# Patient Record
Sex: MALE | Race: WHITE
[De-identification: names, ages, dates, MRNs, and addresses within clinical notes are randomized per-mention and may not be internally consistent; named-entity substitution may affect disease eponyms.]

---
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
## 2017-05-03 NOTE — EKG REPORT
SEVERITY:- ABNORMAL ECG -

SINUS RHYTHM

LEFT ATRIAL ABNORMALITY

:

Confirmed by: Sterling Mccartney MD 03-May-2017 08:12:43
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
## 2017-05-03 NOTE — ER DOCUMENT REPORT
Doctor's Note


Notes: 





05/03/17 11:23


Patient seen and evaluated at bedside, he is slightly anxious but otherwise 

cooperative, requesting assistance with detox placement for alcohol abuse, [facility], we are awaiting callback to set patient up with 

outpatient services, he denies any suicidal or homicidal ideations and has 

otherwise cleared for discharge, I will provide him with a prescription for 

Ativan to help control the symptoms related to alcohol withdrawal





Discharge





- Discharge


Clinical Impression: 


 Aggressive behavior, Alcohol abuse





Condition: Stable


Disposition: HOME, SELF-CARE


Additional Instructions: 


Alcohol Withdrawal





     Your symptoms are caused by alcohol withdrawal. After a period of frequent 

drinking, the brain and body are changed by the alcohol. When you quit or 

reduce your drinking, the nervous system becomes unstable. Withdrawal symptoms 

can start a few hours after your last drink, but sometimes don't begin until a 

couple of days later. Symptoms can include shakiness, sweating, insomnia, nausea

, vomiting, fearfulness, hallucinations, and seizures.


     In addition to the acute effects of alcohol withdrawal, we often have to 

deal with the medical effects of alcoholism. These problems often include 

dehydration, stomach irritation, intestinal bleeding, low blood sugar, liver 

disease, and pancreas inflammation.


     Treatment for alcohol withdrawal includes mild sedatives, vitamins, and 

fluids. You need to be with someone who can help if symptoms become severe. 

Many patients can withdraw at home. Admission to the hospital or a detox 

facility may be necessary if withdrawal symptoms are severe and uncontrollable.


     Abstaining from alcohol is the only effective long-term treatment. If you 

start drinking again, you will not be able to control yourself after the first 

drink. Treatment programs are available. In addition, many alcoholics benefit 

from Alcoholics Anonymous or other support groups available through your 

counselor or [employer] . AL-ANON and ALA-TEEN are support groups for 

friends and family members of an alcoholic.


     Go to the emergency room if you develop persistent vomiting, severe 

abdominal pain, fever, shortness of breath, hallucinations, uncontrollable 

tremors, or seizures.





Depression





     Your evaluation reveals that you have mental depression. While symptoms 

may be vague, they often include disturbance of sleep, fatigue, loss of appetite

, and general loss of interest in life.  While depression may be a side effect 

of drugs, or a reaction to a major change in your life, many cases have no 

known cause.


     If depression is acute, and related to a major loss in your life, you can 

expect it to clear completely with time.  If you have been depressed a long time

, are prone to repeated bouts of depression or low mood, or have been thinking 

of suicide, get help.


     Depression can be treated with anti-depressant medication and counselling.

  Long-term depression will often take a few weeks to clear, even with 

appropriate medication.  Follow-up care is important.


     Contact your physician, the hospital emergency center, crisis line, or 

your counsellor if you are losing control or having self-destructive thoughts.





Counseling Services





     It has been recommended that you seek professional counseling to assist 

you with the stresses that you are experiencing.  Most people at some time in 

their lives experience personal problems with which they need help.  Pride and 

feeling that one can't be helped keep a lot of people from the benefits of 

counseling.


 








On your behalf we have contacted the [facility] office to 

schedule you an appointment with their mental health provider.  U will be 

contacted by either [facility] or directly by the [facility] with your appointment date and time.  Please attend this 

appointment.  Please continue to pursue detox for your chronic alcohol abuse.


Prescriptions: 


Lorazepam [Ativan 0.5 mg Tablet] 0.5 mg PO Q4 PRN #30 tab


 PRN Reason:

## 2017-05-08 NOTE — ER DOCUMENT REPORT
ED General





- General


Chief Complaint: Chest Pain


Stated Complaint: CHEST DISCOMFORT


Mode of Arrival: Medic


Information source: Patient, [facility]


Notes: 


26-year-old male chronic alcoholic presents with complaints of nausea vomiting.

  Patient denies any fevers or chills notes he feels anxious.  Patient was 

given Ativan on his last visit states he took all that with alcohol


TRAVEL OUTSIDE OF THE [place] IN LAST 30 DAYS: No





- HPI


Onset: Other


Onset/Duration: Persistent


Quality of pain: Pressure


Severity: Mild


Pain Level: 1


Associated symptoms: Other


Exacerbated by: Denies


Relieved by: Denies


Similar symptoms previously: Yes


Recently seen / treated by doctor: Yes





- Related Data


Allergies/Adverse Reactions: 


 





cefaclor [From [facility]] Allergy (Verified 05/08/17 10:50)


 











Past Medical History





- Social History


Smoking Status: Current Every Day Smoker


Cigarette use (# per day): Yes


Chew tobacco use (# tins/day): No


Smoking Education Provided: No


Frequency of alcohol use: Heavy


Drug Abuse: Marijuana


Family History: Reviewed & Not Pertinent


Patient has suicidal ideation: No


Patient has homicidal ideation: No





- Past Medical History


Cardiac Medical History: Reports: Hx Hypertension


Renal/ Medical History: Denies: Hx Peritoneal Dialysis


Psychiatric Medical History: Reports: Hx Depression





- Immunizations


Hx Diphtheria, Pertussis, Tetanus Vaccination: Yes





Review of Systems





- Review of Systems


Notes: 


PHYSICAL EXAMINATION:





GENERAL: Well-appearing, well-nourished and in no acute distress.





HEAD: Atraumatic, normocephalic.





EYES: Pupils equal round and reactive to light, extraocular movements intact, 

sclera anicteric, conjunctiva are normal.





ENT: Nares patent, oropharynx clear without exudates.  Moist mucous membranes.





NECK: Normal range of motion, supple without lymphadenopathy





LUNGS: Breath sounds clear to auscultation bilaterally and equal.  No wheezes 

rales or rhonchi.





HEART: Regular rate and rhythm without murmurs





ABDOMEN: Soft, nontender, nondistended abdomen.  No guarding, no rebound.  No 

masses appreciated.





Musculoskeletal: Normal range of motion, no pitting or edema.  No cyanosis.





NEUROLOGICAL: Cranial nerves grossly intact.  Normal speech, normal gait.  

Normal sensory, motor exams 





PSYCH: Anxious





SKIN: Warm, Dry, normal turgor, no rashes or lesions noted.





Physical Exam





- Vital signs


Vitals: 


 











Temp Pulse Resp BP Pulse Ox


 


 98.5 F   107 H  19   157/94 H  96 


 


 05/08/17 10:47  05/08/17 10:47  05/08/17 10:47  05/08/17 10:47  05/08/17 10:47














Course





- Re-evaluation


Re-evalutation: 


05/08/17 11:56








05/08/17 12:50


Lab work notes no significant abnormality patient otherwise appears stable will 

require mental health evaluation





- Vital Signs


Vital signs: 


 











Temp Pulse Resp BP Pulse Ox


 


 98.5 F   107 H  19   157/94 H  96 


 


 05/08/17 10:47  05/08/17 10:47  05/08/17 10:47  05/08/17 10:47  05/08/17 10:47














- Laboratory


Result Diagrams: 


 05/08/17 10:56





 05/08/17 10:56


Laboratory results interpreted by me: 


 











  05/08/17 05/08/17 05/08/17





  10:56 10:56 11:50


 


RBC  5.60 H  


 


Calcium   10.7 H 


 


Total Bilirubin   1.9 H 


 


Direct Bilirubin   0.8 H 


 


AST   444 H 


 


ALT   413 H 


 


Alkaline Phosphatase   134 H 


 


Total Protein   8.4 H 


 


Urine Protein    100 H


 


Urine Ketones    20 H


 


Urine Ascorbic Acid    40 H


 


Salicylates   < 1.0 L 


 


Acetaminophen   < 10 L 














- EKG Interpretation by Me


EKG shows normal: Sinus rhythm, Axis, Intervals, QRS Complexes





Discharge





- Discharge


Clinical Impression: 


 Aggressive behavior, Alcohol abuse





Condition: Stable


Disposition: PSYCH HOSP/UNIT

## 2017-05-08 NOTE — ER DOCUMENT REPORT
ED Psych Disorder / Suicide





- General


Chief Complaint: Chest Pain


Stated Complaint: CHEST DISCOMFORT


Time Seen by Provider: 05/08/17 10:38


Mode of Arrival: Medic


Information source: Patient, [facility] Records


TRAVEL OUTSIDE OF THE [place] IN LAST 30 DAYS: No





- HPI


Patient complains to provider of: Other - ETOH withdrawal


Onset: Just prior to arrival


Onset was: Sudden


Suicide Risk Factors: Bipolar, Lack of social support, Male, Other - moved to 

[place] directly from rehab around the begininning of April.


Normal mood: Yes


Associated symptoms: Normal affect, Normal mood, Anxious


Similar symptoms previously: Yes - last week


Recently seen / treated by doctor: Yes - last week


Notes: 


Patient is a 26 year old male who presents with complaints of chest pain. 

Patient was seen and evaluated last week for etoh abuse. Patient today states 

he took his 30 pills of Ativan and continued to drink. Discussed with patient 

[facility] Disucssed with patient his symptoms, 

and prompted patient to present them to the provider tomorrow in a rational and 

calm manner. Patient states he does not want to go home because there are 15 

bottles of liquor that he wont want to dump.  Patient states, "what do I have 

to say or do to stay? Say I'm going to kill myself?"  Discussed with patient 

that manipulative statements do not warrant remaining in the ED as patient 

earlier stated he was not suicidal.





Patient is A&O.  Mood is anxious with normal affect. Patient denies SI/HI, 

ideations, intent, plan, or means. Patient denies A/V h; delusions not noted. 

Thought processes were organized. Conversational speech was WNL for prosody. 

Intellectual abilities were estimated within average range. Attention and focus 

were fair. Insight, judgment, and impulse control were poor.





Unspecified Alcohol Use Disorder


R/O Bipolar Disorder





Patient is psychiatrically cleared for discharge.  Patient's overall 

presentation is suggestive of attempting to meet his basic needs and stay in 

the Department until his appointment.  [facility]  I consulted with Dr. Connelly in regards to the care and 

management of this patient.





- Related Data


Allergies/Adverse Reactions: 


 





cefaclor [From Ceclor] Allergy (Verified 05/08/17 10:50)


 











Past Medical History





- General


Information source: Patient, [facility] Records





- Social History


Smoking Status: Current Every Day Smoker


Cigarette use (# per day): Yes


Chew tobacco use (# tins/day): No


Smoking Education Provided: Yes


Frequency of alcohol use: Heavy


Drug Abuse: Marijuana


Family History: Reviewed & Not Pertinent


Patient has suicidal ideation: No


Patient has homicidal ideation: No





- Past Medical History


Cardiac Medical History: Reports: Hx Hypertension


Renal/ Medical History: Denies: Hx Peritoneal Dialysis


Psychiatric Medical History: Reports: Hx Depression





- Immunizations


Hx Diphtheria, Pertussis, Tetanus Vaccination: Yes





Physical Exam





- Vital signs


Vitals: 





 











Temp Pulse Resp BP Pulse Ox


 


 98.5 F   107 H  19   157/94 H  96 


 


 05/08/17 10:47  05/08/17 10:47  05/08/17 10:47  05/08/17 10:47  05/08/17 10:47














Course





- Vital Signs


Vital signs: 





 











Temp Pulse Resp BP Pulse Ox


 


 98.7 F   86   16   138/92 H  98 


 


 05/08/17 15:11  05/08/17 15:11  05/08/17 15:11  05/08/17 15:11  05/08/17 15:11














- Laboratory


Result Diagrams: 


 05/08/17 10:56





 05/08/17 10:56


Laboratory results interpreted by me: 





 











  05/08/17 05/08/17 05/08/17





  10:56 10:56 11:50


 


RBC  5.60 H  


 


Calcium   10.7 H 


 


Total Bilirubin   1.9 H 


 


Direct Bilirubin   0.8 H 


 


AST   444 H 


 


ALT   413 H 


 


Alkaline Phosphatase   134 H 


 


Total Protein   8.4 H 


 


Urine Protein    100 H


 


Urine Ketones    20 H


 


Urine Ascorbic Acid    40 H


 


Salicylates   < 1.0 L 


 


Acetaminophen   < 10 L 














Discharge





- Discharge


Clinical Impression: 


 Aggressive behavior, Alcohol abuse





Condition: Stable


Disposition: HOME, SELF-CARE


Additional Instructions: 


Alcohol Withdrawal





     Your symptoms are caused by alcohol withdrawal. After a period of frequent 

drinking, the brain and body are changed by the alcohol. When you quit or 

reduce your drinking, the nervous system becomes unstable. Withdrawal symptoms 

can start a few hours after your last drink, but sometimes don't begin until a 

couple of days later. Symptoms can include shakiness, sweating, insomnia, nausea

, vomiting, fearfulness, hallucinations, and seizures.


     In addition to the acute effects of alcohol withdrawal, we often have to 

deal with the medical effects of alcoholism. These problems often include 

dehydration, stomach irritation, intestinal bleeding, low blood sugar, liver 

disease, and pancreas inflammation.


     Treatment for alcohol withdrawal includes mild sedatives, vitamins, and 

fluids. You need to be with someone who can help if symptoms become severe. 

Many patients can withdraw at home. Admission to the hospital or a detox 

facility may be necessary if withdrawal symptoms are severe and uncontrollable.


     Abstaining from alcohol is the only effective long-term treatment. If you 

start drinking again, you will not be able to control yourself after the first 

drink. Treatment programs are available. In addition, many alcoholics benefit 

from Alcoholics Anonymous or other support groups available through your 

counselor or [employer] . [employer] and [employer] are support groups for 

friends and family members of an alcoholic.


     Go to the emergency room if you develop persistent vomiting, severe 

abdominal pain, fever, shortness of breath, hallucinations, uncontrollable 

tremors, or seizures.








[facility] [address] at [facility].

  





Referrals: 


[facility] [Provider Group] - Follow up as needed

## 2017-05-12 NOTE — ER DOCUMENT REPORT
ED General





- General


Chief Complaint: Suicidal Ideation


Stated Complaint: SUICIDAL IDEATION


Time Seen by Provider: 05/12/17 20:29


Notes: 


Patient is a 26-year-old male with past medical history of chronic alcoholism, 

depression who presents with increasing suicidal ideation over the last 3-4 

days.  Patient states that he contacted the [employer] crisis line and they 

contacted the police.  The police removed his guns from the home and here to 

the emergency department.  Patient was seen in the emergency department several 

days ago for a similar presentation and discharge at that time.  Patient states 

these continued to drink heavily drinks approximately 2 L of whiskey daily.  

Patient notes that his depression and suicidality has been worsening as he 

continues to feel he is unable to control his drinking.  He has not noted 

anything seems to improve his symptoms.  He denies any additional acute medical 

complaints.


TRAVEL OUTSIDE OF THE [place] IN LAST 30 DAYS: No





- Related Data


Allergies/Adverse Reactions: 


 





cefaclor [From Kaznachey] Allergy (Verified 05/08/17 10:50)


 








Home Medications: 


 Current Home Medications





Gabapentin [Neurontin 300 mg Capsule] 300 mg PO Q8 05/12/17 [History]











Past Medical History





- General


Information source: Patient





- Social History


Smoking Status: Current Every Day Smoker


Frequency of alcohol use: Heavy


Drug Abuse: Marijuana


Lives with: Alone


Family History: Reviewed & Not Pertinent





- Past Medical History


Cardiac Medical History: Reports: Hx Hypertension


Renal/ Medical History: Denies: Hx Peritoneal Dialysis


Psychiatric Medical History: Reports: Hx Depression





- Immunizations


Hx Diphtheria, Pertussis, Tetanus Vaccination: Yes





Review of Systems





- Review of Systems


Notes: 


Constitutional: Negative for fever.


HENT: Negative for sore throat.


Eyes: Negative for visual changes.


Cardiovascular: Negative for chest pain.


Respiratory: Negative for shortness of breath.


Gastrointestinal: Negative for abdominal pain, vomiting or diarrhea.


Genitourinary: Negative for dysuria.


Musculoskeletal: Negative for back pain.


Skin: Negative for rash.


Neurological: Negative for headaches, weakness or numbness.





10 point ROS negative except as marked above and in HPI.





Physical Exam





- Vital signs


Interpretation: Tachycardic


Notes: 


PHYSICAL EXAMINATION:





GENERAL: Well-appearing, well-nourished and in no acute distress.





HEAD: Atraumatic, normocephalic.





EYES: Pupils equal round and reactive to light, extraocular movements intact, 

sclera anicteric, conjunctiva are normal.





ENT: nares patent, oropharynx clear without exudates.  Moist mucous membranes.





NECK: Normal range of motion, supple without lymphadenopathy





LUNGS: Breath sounds clear to auscultation bilaterally and equal.  No wheezes 

rales or rhonchi.





HEART: Regular tachycardia without murmurs





ABDOMEN: Soft, nontender, normoactive bowel sounds.  No guarding, no rebound.  

No masses appreciated.





EXTREMITIES: Normal range of motion, no pitting or edema.  No cyanosis.





NEUROLOGICAL: No focal neurological deficits. Moves all extremities 

spontaneously and on command.





PSYCH: Poor eye contact, somewhat depressed mood.  Appears to have poor insight 

into his condition.  Mildly intoxicated.





SKIN: Warm, Dry, normal turgor, no rashes or lesions noted.





Course





- Re-evaluation


Re-evalutation: 


05/12/17 20:45


Patient presents with suicidal ideation and alcohol intoxication. He has no 

specific plan but states "I'm getting to the end of my rope here and really 

think I might hurt myself".  He admits to heavy alcohol use today but notes 

that he is never truly sober. He denies any acute medical complaints. He 

clinically is sober.  He is calm and cooperative.  He does have a history of 

complex alcohol withdrawals in the past and Valium will be ordered 

prophylactically.  Patient has been placed on IVC and will be evaluated by 

psychiatry in the morning.





05/13/17 03:16


Medical screening laboratories reveal LFT elevations consistent with an chronic 

alcoholic hepatitis.  Patient has no symptoms to suggest an acute hepatitis 

including no focal right upper quadrant tenderness, fever or vital sign 

abnormalities.  His laboratory is other wise demonstrated a profound alcohol 

elevation and given patient's lucidity at time of arrival, he clearly has a 

very high alcohol tolerance.  Patient has remained with alcohol withdrawal 

symptoms at this time.  He has not required any when necessary Valiums.  He is 

cleared for psychiatric evaluation the morning.





- Laboratory


Result Diagrams: 


 05/12/17 20:25





 05/12/17 20:25


Laboratory results interpreted by me: 


 











  05/12/17 05/12/17





  20:25 20:25


 


RBC  5.61 H 


 


RDW  14.6 H 


 


Sodium   145.6 H


 


Direct Bilirubin   0.6 H


 


AST   415 H


 


ALT   419 H


 


Total Protein   8.5 H


 


Salicylates   < 1.0 L


 


Acetaminophen   < 10 L


 


Serum Alcohol   328 H*














- EKG Interpretation by Me


Additional EKG results interpreted by me: 





05/13/17 03:18


Sinus tachycardia.  Rate 102.  No ST elevations or depressions QTc is 459.

## 2017-05-13 NOTE — ER DOCUMENT REPORT
Doctor's Note


Notes: 


05/13/17 09:15


As the rounding physician for our psychiatric patients, I have reviewed the 

chart, vitals, lab work. Patient has been examined and noted to be stable. I am 

awaiting mental health in put.





05/13/17 10:14

## 2017-05-13 NOTE — PSYCHOLOGICAL NOTE
Psych Note





- Psych Note


Psych Note: 


Patient is a 26-year-old male with past medical history of chronic alcoholism, 

depression who presents with increasing suicidal ideation over the last 3-4 

days.  Patient states that he contacted the [employer] crisis line and they 

contacted the police.  The police removed his guns from the home and here to 

the emergency department.  Patient was seen in the emergency department several 

days ago for a similar presentation and discharge at that time.  Patient states 

these continued to drink heavily drinks approximately 2 L of whiskey daily.  

Patient notes that his depression and suicidality has been worsening as he 

continues to feel he is unable to control his drinking.  He has not noted 

anything seems to improve his symptoms.  He denies any additional acute medical 

complaints.





 


Clinician conducted a record review- Patient was last seen by the Behavioral 

Team on 5/8/17.  During that evaluation, it is noted the "Patient states, "what 

do I have to say or do to stay? Say I'm going to kill myself?"  Discussed with 

patient that manipulative statements do not warrant remaining in the ED as 

patient earlier stated he was not suicidal." [facility]





Patient disclosed that his suicidal ideation started "a few weeks ago."  He 

continued disclosed that he is "at the end of the rope."  He states that he is 

"spiraling" out of control.  When asked if he attended his appointment and he 

stated yes however he did not fill his prescription.  He continued disclosed 

that he is unable to fill the prescription because he gets anxious in crowds.  

Clinician asked how his suicidal ideation started a few weeks ago when he 

stated he was not suicidal 5 days ago.  Patient had no response.  Clinician 

discussed patient's stated emotions of being anxious in crowds and how that 

would affect him being inpatient.  Patient states he just wants to get help to 

keep sober.





Patient is alert and orientated to person, place, time and circumstance.  Mood 

is euthymic with congruent affect.  Patient denies suicidal and homicidal 

ideation; it is noted patient attempted to state SI that was ongoing for weeks 

which is a direct conflict of previous statements.  Patient denies auditory and 

visual hallucinations; patient is not demonstrating behaviour what would be 

congruent to responding to internal stimuli.  No delusions are noted.  Thought 

process is organized and linear.  Eye contact was well maintained.  

Intellectual abilities appear to be within average range.  Attention and 

concentration is good.  Insight, judgment, and impulse control is poor.





Unspecified Alcohol Use Disorder


R/O  Bipolar Disorder





Patient is cleared for discharge.  Patient's overall presentation is suggestive 

of attempting of achieving secondary gain.  Patient is reported to arrive at 

[facility] ED change over into scrubs and into bed without directions.  Patient 

attempted to state he was suicidal and had been for a few weeks however patient 

denied this 5 days ago.  Patient is requesting resources and assistance for 

sobriety for alcohol; clinician provided resources for both residential short 

and long-term treatment centers for substance abuse.  I consulted with Dr. Connelly in regards to the care and management of this patient.

## 2017-05-13 NOTE — EKG REPORT
SEVERITY:- OTHERWISE NORMAL ECG -

SINUS TACHYCARDIA

:

Confirmed by: Flower Pascual 13-May-2017 13:06:00

## 2018-02-17 ENCOUNTER — HOSPITAL ENCOUNTER (EMERGENCY)
Dept: HOSPITAL 62 - ER | Age: 28
LOS: 1 days | Discharge: HOME | End: 2018-02-18
Payer: OTHER GOVERNMENT

## 2018-02-17 DIAGNOSIS — S51.812A: Primary | ICD-10-CM

## 2018-02-17 DIAGNOSIS — F17.200: ICD-10-CM

## 2018-02-17 DIAGNOSIS — Z23: ICD-10-CM
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
## 2018-02-17 DIAGNOSIS — F10.920: ICD-10-CM

## 2018-02-17 PROCEDURE — 36415 COLL VENOUS BLD VENIPUNCTURE: CPT

## 2018-02-17 PROCEDURE — 0HQEXZZ REPAIR LEFT LOWER ARM SKIN, EXTERNAL APPROACH: ICD-10-PCS | Performed by: NURSE PRACTITIONER

## 2018-02-17 PROCEDURE — 80307 DRUG TEST PRSMV CHEM ANLYZR: CPT

## 2018-02-17 PROCEDURE — 85025 COMPLETE CBC W/AUTO DIFF WBC: CPT

## 2018-02-17 PROCEDURE — 90471 IMMUNIZATION ADMIN: CPT

## 2018-02-17 PROCEDURE — 90715 TDAP VACCINE 7 YRS/> IM: CPT

## 2018-02-17 PROCEDURE — 99284 EMERGENCY DEPT VISIT MOD MDM: CPT

## 2018-02-17 PROCEDURE — 80053 COMPREHEN METABOLIC PANEL: CPT

## 2018-02-17 PROCEDURE — 81001 URINALYSIS AUTO W/SCOPE: CPT

## 2018-02-18 VITALS — SYSTOLIC BLOOD PRESSURE: 124 MMHG | DIASTOLIC BLOOD PRESSURE: 78 MMHG

## 2018-02-18 LAB
ADD MANUAL DIFF: NO
ALBUMIN SERPL-MCNC: 4.7 G/DL (ref 3.5–5)
ALP SERPL-CCNC: 59 U/L (ref 38–126)
ALT SERPL-CCNC: 45 U/L (ref 21–72)
ANION GAP SERPL CALC-SCNC: 12 MMOL/L (ref 5–19)
APPEARANCE UR: CLEAR
APTT PPP: YELLOW S
AST SERPL-CCNC: 60 U/L (ref 17–59)
BARBITURATES UR QL SCN: NEGATIVE
BASOPHILS # BLD AUTO: 0.1 10^3/UL (ref 0–0.2)
BASOPHILS NFR BLD AUTO: 1.1 % (ref 0–2)
BILIRUB DIRECT SERPL-MCNC: 0.2 MG/DL (ref 0–0.4)
BILIRUB SERPL-MCNC: 0.4 MG/DL (ref 0.2–1.3)
BILIRUB UR QL STRIP: NEGATIVE
BUN SERPL-MCNC: 14 MG/DL (ref 7–20)
CALCIUM: 9.8 MG/DL (ref 8.4–10.2)
CHLORIDE SERPL-SCNC: 107 MMOL/L (ref 98–107)
CO2 SERPL-SCNC: 27 MMOL/L (ref 22–30)
EOSINOPHIL # BLD AUTO: 0 10^3/UL (ref 0–0.6)
EOSINOPHIL NFR BLD AUTO: 0.1 % (ref 0–6)
ERYTHROCYTE [DISTWIDTH] IN BLOOD BY AUTOMATED COUNT: 13.9 % (ref 11.5–14)
ETHANOL SERPL-MCNC: 261 MG/DL
GLUCOSE SERPL-MCNC: 98 MG/DL (ref 75–110)
GLUCOSE UR STRIP-MCNC: NEGATIVE MG/DL
HCT VFR BLD CALC: 46.6 % (ref 37.9–51)
HGB BLD-MCNC: 16.4 G/DL (ref 13.5–17)
KETONES UR STRIP-MCNC: NEGATIVE MG/DL
LYMPHOCYTES # BLD AUTO: 2.4 10^3/UL (ref 0.5–4.7)
LYMPHOCYTES NFR BLD AUTO: 40 % (ref 13–45)
MCH RBC QN AUTO: 33.7 PG (ref 27–33.4)
MCHC RBC AUTO-ENTMCNC: 35.3 G/DL (ref 32–36)
MCV RBC AUTO: 96 FL (ref 80–97)
METHADONE UR QL SCN: NEGATIVE
MONOCYTES # BLD AUTO: 0.5 10^3/UL (ref 0.1–1.4)
MONOCYTES NFR BLD AUTO: 8.9 % (ref 3–13)
NEUTROPHILS # BLD AUTO: 2.9 10^3/UL (ref 1.7–8.2)
NEUTS SEG NFR BLD AUTO: 49.9 % (ref 42–78)
NITRITE UR QL STRIP: NEGATIVE
PCP UR QL SCN: NEGATIVE
PH UR STRIP: 7 [PH] (ref 5–9)
PLATELET # BLD: 280 10^3/UL (ref 150–450)
POTASSIUM SERPL-SCNC: 4.5 MMOL/L (ref 3.6–5)
PROT SERPL-MCNC: 7.4 G/DL (ref 6.3–8.2)
PROT UR STRIP-MCNC: NEGATIVE MG/DL
RBC # BLD AUTO: 4.88 10^6/UL (ref 4.35–5.55)
SODIUM SERPL-SCNC: 146.3 MMOL/L (ref 137–145)
SP GR UR STRIP: 1.02
TOTAL CELLS COUNTED % (AUTO): 100 %
URINE AMPHETAMINES SCREEN: NEGATIVE
URINE BENZODIAZEPINES SCREEN: (no result)
URINE COCAINE SCREEN: NEGATIVE
URINE MARIJUANA (THC) SCREEN: (no result)
UROBILINOGEN UR-MCNC: 4 MG/DL (ref ?–2)
WBC # BLD AUTO: 5.9 10^3/UL (ref 4–10.5)

## 2018-02-18 NOTE — RADIOLOGY REPORT (SQ)
EXAM DESCRIPTION: 



FOREARM LEFT



CLINICAL HISTORY: 



27 years, Male, laceration



COMPARISON:

None.



NUMBER OF VIEWS:

2





Findings:



Bones, joints, and soft tissues of the left forearm appear

intact. Known soft tissue injury; no radiopaque foreign body.



IMPRESSION:



No acute findings.

## 2018-02-18 NOTE — ER DOCUMENT REPORT
ED General





- General


Chief Complaint: Laceration


Stated Complaint: LEFT ARM LACERATION


Time Seen by Provider: 02/18/18 01:25


Mode of Arrival: Medic


Information source: Patient


Notes: 





27-year-old male presents to ED for multiple self-inflicted lacerations to 

bilateral arms.  He states that he was talking to his mom about cutting himself 

tonight  she called the police.  He states the police came to his hotel room 

and called EMS to bring him to the hospital.  He states he is a cutter has cut 

himself multiple times this is not a new thing.  He denies any thoughts of 

suicide.  He states he is recently been evicted from his home and is been 

staying and they hotel for about 2 weeks.  He has multiple cuts to both arms 12 

new ones from today.


TRAVEL OUTSIDE OF THE [place] IN LAST 30 DAYS: No





- HPI


Onset: This evening


Onset/Duration: Sudden


Quality of pain: Sharp


Severity: Moderate


Pain Level: 3


Associated symptoms: Other - Patient razor multiple times to the left forearm.  

He states he was talking to his mother the next thing he knows to please come 

knocking on his door and making come to the emergency room.  He states he was 

not thinking of killing himself he has a cutter and has done this in the past


Exacerbated by: Movement


Relieved by: Denies


Similar symptoms previously: Yes


Recently seen / treated by doctor: Yes





- Related Data


Allergies/Adverse Reactions: 


 





cefaclor [From Ceclor] Allergy (Verified 05/08/17 10:50)


 











Past Medical History





- General


Information source: Patient





- Social History


Smoking Status: Current Every Day Smoker


Chew tobacco use (# tins/day): No


Frequency of alcohol use: Heavy


Drug Abuse: Marijuana


Family History: Reviewed & Not Pertinent


Patient has suicidal ideation: No


Patient has homicidal ideation: No





- Past Medical History


Cardiac Medical History: Reports: Hx Hypertension


Pulmonary Medical History: Reports: None


EENT Medical History: Reports: None


Neurological Medical History: Reports: None


Endocrine Medical History: Reports: None


Renal/ Medical History: Reports: None


Malignancy Medical History: Reports None


GI Medical History: Reports: None


Psychiatric Medical History: Reports: Hx Depression - with anxiety





- Immunizations


Hx Diphtheria, Pertussis, Tetanus Vaccination: Yes





Physical Exam





- Vital signs


Vitals: 


 











Temp Pulse Resp BP Pulse Ox


 


 98.5 F   95   20   145/95 H  96 


 


 02/17/18 23:34  02/17/18 23:34  02/17/18 23:34  02/17/18 23:34  02/17/18 23:34














- Notes


Notes: 





PHYSICAL EXAMINATION:





GENERAL: Well-appearing, well-nourished and in no acute distress.





HEAD: Atraumatic, normocephalic.





EYES: Pupils equal round and reactive to light, extraocular movements intact, 

sclera anicteric, conjunctiva are normal.





ENT: Nares patent, oropharynx clear without exudates.  Moist mucous membranes.





NECK: Normal range of motion, supple without lymphadenopathy





LUNGS: Breath sounds clear to auscultation bilaterally and equal.  No wheezes 

rales or rhonchi.





HEART: Regular rate and rhythm without murmurs





ABDOMEN: Soft, nontender, nondistended abdomen.  No guarding, no rebound.  No 

masses appreciated.





Musculoskeletal: Normal range of motion, no pitting or edema.  No cyanosis.





NEUROLOGICAL: Cranial nerves grossly intact.  Normal speech, normal gait.  

Normal sensory, motor exams 





PSYCH: Normal mood, normal affect.





SKIN: Multiple lacerations to both arms in many stages of healing some 

completely healed some from a week ago some from a couple days ago some from 

last night.  He had 12 new lacerations totaling 32 cm to his left forearm.





Course





- Re-evaluation


Re-evalutation: 





02/18/18 05:41


Patient able to verbalize plans to call the uber to go home.  He states he he 

plans to go home and sleep off the rest of his alcohol and drink plenty of 

fluids.  He states he does not plan to harm himself or anyone else.  He states 

that he does not plan to cut himself again today.  He knows that he has a bad 

habit of cutting himself but he does not plan to do that again today.  He says 

he has a friend that he is going to call has been some time with him today.  

Patient is alert oriented is able to walk with the even steady gait.  He is not 

slurring his speech.  He states he is ready to go home and does not want to 

stay here anymore.  Patient tolerated suturing of his 12 lacerations





- Vital Signs


Vital signs: 


 











Temp Pulse Resp BP Pulse Ox


 


 97.9 F   78   16   130/89 H  95 


 


 02/18/18 02:04  02/18/18 02:04  02/18/18 02:04  02/18/18 02:04  02/18/18 02:04














- Laboratory


Result Diagrams: 


 02/18/18 00:48





 02/18/18 00:48


Laboratory results interpreted by me: 


 











  02/18/18 02/18/18 02/18/18





  00:48 00:48 00:48


 


MCH  33.7 H  


 


Sodium   146.3 H 


 


AST   60 H 


 


Urine Urobilinogen    4.0 H


 


Urine Ascorbic Acid    40 H














- Diagnostic Test


Radiology reviewed: Image reviewed, Reports reviewed





Procedures





- Laceration/Wound Repair


  ** Left Arm


Time completed: 04:26


Wound length (cm): 32 - 12 lacerations total 32 cm


Wound's Depth, Shape: Superficial, Linear


Laceration pre-procedure: Sterile PPE donned, Sterile drapes applied, Shur-

Clens applied


Anesthetic type: 1% Lidocaine


Volume Anesthetic (mLs): 12


Wound explored: Contaminated


Irrigated w/ Saline (mLs): 500


Wound Repaired With: Sutures


Suture Size/Type: Ethilon


Number of Sutures: 35 - total for 12 lacerations


Layer Closure?: No





Discharge





- Discharge


Clinical Impression: 


 Self-inflicted lacerations left forearm , Multiple old lacerations to both arms





Alcohol intoxication


Qualifiers:


 Complication of substance-induced condition: uncomplicated Qualified Code(s): 

F10.920 - Alcohol use, unspecified with intoxication, uncomplicated





Condition: Stable


Disposition: HOME, SELF-CARE


Additional Instructions: 


Acute Alcohol Intoxication





     Your evaluation revealed very high levels of alcohol.  You can die from 

drinking a large amount of alcohol rapidly!  Further, there's the risk of falls

, traffic accidents, and fights.  A high portion (about 50 percent) of the 

serious injuries seen in hospital emergency rooms are caused by alcohol.


     Alcohol overdosage is usually due to an underlying emotional or 

psychiatric problem.  You may benefit from counselling.


     If "binge" drinking is an ongoing problem for you, or if you drink ANY 

AMOUNT of alcohol EVERY day, you most likely have a tendency to alcoholism.  

You should avoid alcohol totally.  We can refer you for treatment.  Persons 

with alcohol problems are often also prone to other addictions -- you should 

discuss any use of medications or drugs with the doctor.


     You should be watched at home for the next several hours by someone who 

has not been drinking. Get extra fluids for the next 24 hours.  Call the doctor 

if there is repeated vomiting, increasing headache, decreasing level of 

alertness, or any other worsening.





LACERATION CARE:





     Your laceration has been sutured to keep the skin edges aligned during 

healing.  The time of suture removal depends on the nature and location of your 

cut.  Please follow the care instructions the doctor has outlined for you and 

return for further care, according to the schedule you've been given.


     Keep the wound and dressing clean.  Unless you were told otherwise, you 

may shower daily, blotting the wound dry with a clean, unused towel.  At other 

times, If the dressing gets wet or blood soaked, remove it and blot the wound 

dry, then reapply a new dressing.  Unless you were instructed otherwise, 

dressings should be changed at least daily.


     If any signs of infection occur (swelling, redness, drainage, increasing 

tenderness, red streaks, tender lumps in the armpit or groin above the 

laceration, or fever), see the doctor immediately.








SOAP CLEANSING:


     Gently wash the wound daily using a mild soap (like Ivory, Phisoderm, 

Neutrogena).  Use warm water, rubbing gently until all debris, ooze, and 

crusting have been washed from the wound.  Allow to dry briefly (about 10 

minutes) after cleaning.  Repeat this cleansing at least three times a day for 

the first two days and then once or twice a day.








ANTIBIOTIC OINTMENT PROTECTION:


     Your wounds are such that dressing them is not practical or optional.  

After cleansing, you should apply a thin coating of antibiotic ointment (

Bacitracin, not Neosporin) to the wounds at least three times daily.  This 

lessens infection risk, and may decrease the amount of scarring.  Use a q-tip 

or dull butter knife, not your finger, to apply this ointment.


     Any debris or ooze which builds up in the ointment should be gently rubbed 

off with a sterile gauze pad.  Harder crusting may need to be gently scrubbed 

off with a clean wash cloth with soap and warm water, perhaps applying a warm, 

wet wash cloth to the wound for ten minutes first.


     Development of redness, severe itching, or blistering may mean allergy to 

the ointment.  See the doctor.








TETANUS IMMUNIZATION GIVEN:


     You have been given an immunization against tetanus.  Please record this 

in your records.  In general, a booster is needed only once every 10 years.  

The tetanus shot protects against tetanus or "lockjaw," which is a complication 

of certain wound infections (the tetanus shot cannot protect against the actual 

infection).


     The immunization site may become warm and red due to local reaction.  If 

this occurs, apply warm compresses and take aspirin or ibuprofen to reduce 

inflammation and discomfort.  Return for evaluation if the reaction becomes 

severe.











FOLLOW-UP CARE:


     Please return in _3____ days for an infection check and dressing change.





    Your sutures should be removed in  __9___  days.





    To facilitate a timely removal of your sutures, you may return to the 

Emergency Department at [facility] at the [facility].  You do not need to call for 

an appointment, but the best time to come in for suture removal is early in the 

morning.





     If you have been referred to another physician for follow-up care, call 

that physicians office for an appointment as you were instructed.  If you 

experience a significant change in your laceration, or if you are concerned 

there may be an infection (swelling, redness, drainage, increasing tenderness, 

red streaks, tender lumps in the armpit or groin above the laceration, or fever)

, return to the Emergency Department immediately re-evaluation.








Prescriptions: 


Doxycycline Hyclate 100 mg PO BID #14 capsule


Forms:  Elevated Blood Pressure, Smoking Cessation Education


Referrals: 


JOYCE GARZA NP [Primary Care Provider] - Follow up as needed

## 2018-05-29 ENCOUNTER — HOSPITAL ENCOUNTER (EMERGENCY)
Dept: HOSPITAL 62 - ER | Age: 28
Discharge: HOME | End: 2018-05-29
Payer: OTHER GOVERNMENT

## 2018-05-29 VITALS — DIASTOLIC BLOOD PRESSURE: 94 MMHG | SYSTOLIC BLOOD PRESSURE: 146 MMHG

## 2018-05-29 DIAGNOSIS — F14.10: ICD-10-CM

## 2018-05-29 DIAGNOSIS — E86.0: ICD-10-CM

## 2018-05-29 DIAGNOSIS — R00.0: ICD-10-CM

## 2018-05-29 DIAGNOSIS — Z91.5: ICD-10-CM

## 2018-05-29 DIAGNOSIS — F17.200: ICD-10-CM

## 2018-05-29 DIAGNOSIS — R11.2: ICD-10-CM

## 2018-05-29 DIAGNOSIS — R44.3: ICD-10-CM

## 2018-05-29 DIAGNOSIS — I10: ICD-10-CM

## 2018-05-29 DIAGNOSIS — F12.10: ICD-10-CM

## 2018-05-29 DIAGNOSIS — F10.20: Primary | ICD-10-CM

## 2018-05-29 DIAGNOSIS — F15.10: ICD-10-CM

## 2018-05-29 LAB
ADD MANUAL DIFF: NO
ALBUMIN SERPL-MCNC: 5.5 G/DL (ref 3.5–5)
ALP SERPL-CCNC: 76 U/L (ref 38–126)
ALT SERPL-CCNC: 51 U/L (ref 21–72)
ANION GAP SERPL CALC-SCNC: 25 MMOL/L (ref 5–19)
AST SERPL-CCNC: 64 U/L (ref 17–59)
BASOPHILS # BLD AUTO: 0.1 10^3/UL (ref 0–0.2)
BASOPHILS NFR BLD AUTO: 0.5 % (ref 0–2)
BILIRUB DIRECT SERPL-MCNC: 0.5 MG/DL (ref 0–0.4)
BILIRUB SERPL-MCNC: 1 MG/DL (ref 0.2–1.3)
BUN SERPL-MCNC: 19 MG/DL (ref 7–20)
CALCIUM: 10.5 MG/DL (ref 8.4–10.2)
CHLORIDE SERPL-SCNC: 100 MMOL/L (ref 98–107)
CO2 SERPL-SCNC: 23 MMOL/L (ref 22–30)
EOSINOPHIL # BLD AUTO: 0 10^3/UL (ref 0–0.6)
EOSINOPHIL NFR BLD AUTO: 0.1 % (ref 0–6)
ERYTHROCYTE [DISTWIDTH] IN BLOOD BY AUTOMATED COUNT: 13.1 % (ref 11.5–14)
ETHANOL SERPL-MCNC: 48 MG/DL
GLUCOSE SERPL-MCNC: 105 MG/DL (ref 75–110)
HCT VFR BLD CALC: 53.2 % (ref 37.9–51)
HGB BLD-MCNC: 18.4 G/DL (ref 13.5–17)
LYMPHOCYTES # BLD AUTO: 2.4 10^3/UL (ref 0.5–4.7)
LYMPHOCYTES NFR BLD AUTO: 16.3 % (ref 13–45)
MCH RBC QN AUTO: 31.7 PG (ref 27–33.4)
MCHC RBC AUTO-ENTMCNC: 34.6 G/DL (ref 32–36)
MCV RBC AUTO: 92 FL (ref 80–97)
MONOCYTES # BLD AUTO: 0.8 10^3/UL (ref 0.1–1.4)
MONOCYTES NFR BLD AUTO: 5.2 % (ref 3–13)
NEUTROPHILS # BLD AUTO: 11.3 10^3/UL (ref 1.7–8.2)
NEUTS SEG NFR BLD AUTO: 77.9 % (ref 42–78)
PLATELET # BLD: 308 10^3/UL (ref 150–450)
POTASSIUM SERPL-SCNC: 4.4 MMOL/L (ref 3.6–5)
PROT SERPL-MCNC: 9.1 G/DL (ref 6.3–8.2)
RBC # BLD AUTO: 5.8 10^6/UL (ref 4.35–5.55)
SODIUM SERPL-SCNC: 148 MMOL/L (ref 137–145)
TOTAL CELLS COUNTED % (AUTO): 100 %
WBC # BLD AUTO: 14.5 10^3/UL (ref 4–10.5)

## 2018-05-29 PROCEDURE — 36415 COLL VENOUS BLD VENIPUNCTURE: CPT

## 2018-05-29 PROCEDURE — 80053 COMPREHEN METABOLIC PANEL: CPT

## 2018-05-29 PROCEDURE — 96375 TX/PRO/DX INJ NEW DRUG ADDON: CPT

## 2018-05-29 PROCEDURE — 96361 HYDRATE IV INFUSION ADD-ON: CPT

## 2018-05-29 PROCEDURE — 85025 COMPLETE CBC W/AUTO DIFF WBC: CPT

## 2018-05-29 PROCEDURE — 93010 ELECTROCARDIOGRAM REPORT: CPT

## 2018-05-29 PROCEDURE — 93005 ELECTROCARDIOGRAM TRACING: CPT

## 2018-05-29 PROCEDURE — S0028 INJECTION, FAMOTIDINE, 20 MG: HCPCS

## 2018-05-29 PROCEDURE — 99284 EMERGENCY DEPT VISIT MOD MDM: CPT

## 2018-05-29 PROCEDURE — 80307 DRUG TEST PRSMV CHEM ANLYZR: CPT

## 2018-05-29 PROCEDURE — 96374 THER/PROPH/DIAG INJ IV PUSH: CPT

## 2018-05-29 NOTE — ER DOCUMENT REPORT
ED General





- General


Mode of Arrival: Medic


Information source: Patient


TRAVEL OUTSIDE OF THE [place] IN LAST 30 DAYS: No





<JOYCE LEAL - Last Filed: 05/29/18 09:29>





<LOWELL CARR - Last Filed: 05/29/18 12:14>





- General


Chief Complaint: ETOH Abuse


Stated Complaint: POSSIBLE ETOH


Time Seen by Provider: 05/29/18 09:26


Notes: 





27 y.o male with a PMHx of HTN, alcoholism, bipolar spectrum and depression 

presents to the ED for ETOH abuse and vomiting. Pt began having nausea and 

vomiting this morning and believes he is having alcohol withdrawals. He reports 

that the last time he had alcohol today was around 0500. He also reports some 

hallucinations which he reports he has had in the past with withdrawals. Pt 

reports that the last time he tried cutting or hurting himself was about 3 

months ago. He reports the last use of Methamphetamines was Saturday, May 26th. 

(JOYCE LEAL)





- Related Data


Allergies/Adverse Reactions: 


 





cefaclor [From Ceclor] Allergy (Verified 05/29/18 08:22)


 











Past Medical History





- General


Information source: Patient





- Social History


Smoking Status: Current Every Day Smoker


Frequency of alcohol use: Heavy


Drug Abuse: Cocaine, Marijuana, Methamphetamine


Family History: Reviewed & Not Pertinent


Patient has suicidal ideation: No


Patient has homicidal ideation: No





- Past Medical History


Cardiac Medical History: Reports: Hx Hypertension


Renal/ Medical History: Denies: Hx Peritoneal Dialysis


Psychiatric Medical History: Reports: Hx Bipolar Disorder, Hx Depression - with 

anxiety





- Immunizations


Hx Diphtheria, Pertussis, Tetanus Vaccination: Yes





<JOYCE LEAL - Last Filed: 05/29/18 09:29>





Review of Systems





- Review of Systems


Constitutional: See HPI - Alcohol abuse


EENT: No symptoms reported


Cardiovascular: No symptoms reported


Respiratory: No symptoms reported


Gastrointestinal: See HPI, Nausea, Vomiting


Genitourinary: No symptoms reported


Male Genitourinary: No symptoms reported


Musculoskeletal: No symptoms reported


Skin: No symptoms reported


Hematologic/Lymphatic: No symptoms reported


Neurological/Psychological: See HPI, Hallucinations


-: Yes All other systems reviewed and negative





<JOYCE LEAL - Last Filed: 05/29/18 09:29>





Physical Exam





<JOYCE ELAL - Last Filed: 05/29/18 09:29>





<LOWELL CARR - Last Filed: 05/29/18 12:14>





- Vital signs


Vitals: 





 











Temp Pulse Resp BP Pulse Ox


 


 98.2 F   115 H  24 H  136/93 H  97 


 


 05/29/18 08:20  05/29/18 08:20  05/29/18 08:20  05/29/18 08:20  05/29/18 08:20














- Notes


Notes: 





Physical Exam:


 





General: Alert, appears well. 


 


HEENT: Normocephalic. Atraumatic. PERRL. Extraocular movements intact.Tongue 

coated, mucous membranes dry.


 


Neck: Supple. Non-tender.


 


Respiratory: No respiratory distress. Clear and equal breath sounds bilaterally.


 


Cardiovascular: Tachycardic rate and regular rhythm. 


 


Abdominal: Normal Inspection. Non-tender. No distension. Normal Bowel Sounds. 


 


Back: Soft, non-tender. No deformity or step off.


 


Extremities: Moves all four extremities.


Upper extremities: Normal inspection. Normal ROM.  


Lower extremities: Normal inspection. No edema. Normal ROM.


 


Neurological: Normal cognition. AAOx3. Normal speech.   (JOYCE LEAL)





Course





- Laboratory


Result Diagrams: 


 05/29/18 08:30





 05/29/18 08:30





<JOYCE LEAL - Last Filed: 05/29/18 09:29>





- Laboratory


Result Diagrams: 


 05/29/18 08:30





 05/29/18 08:30





<LOWELL CARR - Last Filed: 05/29/18 12:14>





- Vital Signs


Vital signs: 





 











Temp Pulse Resp BP Pulse Ox


 


 98.2 F   115 H  24 H  136/93 H  97 


 


 05/29/18 08:20  05/29/18 08:20  05/29/18 08:20  05/29/18 08:20  05/29/18 08:20














- Laboratory


Laboratory results interpreted by me: 





 











  05/29/18 05/29/18





  08:30 08:30


 


WBC  14.5 H 


 


RBC  5.80 H 


 


Hgb  18.4 H 


 


Hct  53.2 H 


 


Absolute Neutrophils  11.3 H 


 


Sodium   148.0 H


 


Anion Gap   25 H


 


Calcium   10.5 H


 


Direct Bilirubin   0.5 H


 


AST   64 H


 


Total Protein   9.1 H


 


Albumin   5.5 H














Discharge





<JOYCE LEAL - Last Filed: 05/29/18 09:29>





<LOWELL CARR - Last Filed: 05/29/18 12:14>





- Discharge


Clinical Impression: 


 Dehydration, Chronic alcohol abuse





Nausea and vomiting


Qualifiers:


 Vomiting type: unspecified Vomiting Intractability: non-intractable Qualified 

Code(s): R11.2 - Nausea with vomiting, unspecified





Withdrawal symptoms, alcohol


Qualifiers:


 Complication of substance-induced condition: uncomplicated Qualified Code(s): 

F10.230 - Alcohol dependence with withdrawal, uncomplicated





Condition: Stable


Disposition: HOME, SELF-CARE


Additional Instructions: 


Continue your regular medications.


Take medication as prescribed to blunt the withdrawal response.


Do not take this medication if you decide to continue drinking alcohol.





Follow-up with your doctor or a local mental health facility for assistance 

with detox if you decide to stop drinking.





RETURN TO THE EMERGENCY ROOM IF ANY NEW OR WORSENING SYMPTOMS.








Prescriptions: 


Chlordiazepoxide HCl [Librium 25 mg Capsule] 1 cap PO Q8 #25 capsule


Scribe Attestation: 





05/29/18 12:13


I personally performed the services described in the documentation, reviewed 

and edited the documentation which was dictated to the scribe in my presence, 

and it accurately records my words and actions. (LOWELL CARR)





Scribe Documentation





- Scribe


Written by Pineda:: Pineda Bergeron [id number] 5/29/18


acting as scribe for :: Jossy





<JOYCE LEAL - Last Filed: 05/29/18 09:29>

## 2018-05-29 NOTE — EKG REPORT
SEVERITY:- BORDERLINE ECG -

SINUS TACHYCARDIA

:

Confirmed by: Flower Pascual 29-May-2018 23:33:54

## 2018-07-09 ENCOUNTER — HOSPITAL ENCOUNTER (EMERGENCY)
Dept: HOSPITAL 62 - ER | Age: 28
LOS: 1 days | Discharge: HOME | End: 2018-07-10
Payer: OTHER GOVERNMENT

## 2018-07-09 DIAGNOSIS — F10.10: ICD-10-CM

## 2018-07-09 DIAGNOSIS — F41.9: ICD-10-CM

## 2018-07-09 DIAGNOSIS — I10: ICD-10-CM

## 2018-07-09 DIAGNOSIS — F17.200: ICD-10-CM

## 2018-07-09 DIAGNOSIS — F33.3: Primary | ICD-10-CM

## 2018-07-09 LAB
ADD MANUAL DIFF: NO
ALBUMIN SERPL-MCNC: 5 G/DL (ref 3.5–5)
ALP SERPL-CCNC: 55 U/L (ref 38–126)
ALT SERPL-CCNC: 29 U/L (ref 21–72)
ANION GAP SERPL CALC-SCNC: 16 MMOL/L (ref 5–19)
APAP SERPL-MCNC: < 10 UG/ML (ref 10–30)
APPEARANCE UR: CLEAR
APTT PPP: (no result) S
AST SERPL-CCNC: 38 U/L (ref 17–59)
BARBITURATES UR QL SCN: NEGATIVE
BASOPHILS # BLD AUTO: 0.1 10^3/UL (ref 0–0.2)
BASOPHILS NFR BLD AUTO: 0.9 % (ref 0–2)
BILIRUB DIRECT SERPL-MCNC: 0.4 MG/DL (ref 0–0.4)
BILIRUB SERPL-MCNC: 0.4 MG/DL (ref 0.2–1.3)
BILIRUB UR QL STRIP: NEGATIVE
BUN SERPL-MCNC: 11 MG/DL (ref 7–20)
CALCIUM: 10 MG/DL (ref 8.4–10.2)
CHLORIDE SERPL-SCNC: 108 MMOL/L (ref 98–107)
CO2 SERPL-SCNC: 29 MMOL/L (ref 22–30)
EOSINOPHIL # BLD AUTO: 0.1 10^3/UL (ref 0–0.6)
EOSINOPHIL NFR BLD AUTO: 0.8 % (ref 0–6)
ERYTHROCYTE [DISTWIDTH] IN BLOOD BY AUTOMATED COUNT: 14.6 % (ref 11.5–14)
ETHANOL SERPL-MCNC: 317 MG/DL
GLUCOSE SERPL-MCNC: 92 MG/DL (ref 75–110)
GLUCOSE UR STRIP-MCNC: NEGATIVE MG/DL
HCT VFR BLD CALC: 48 % (ref 37.9–51)
HGB BLD-MCNC: 16.9 G/DL (ref 13.5–17)
KETONES UR STRIP-MCNC: NEGATIVE MG/DL
LYMPHOCYTES # BLD AUTO: 3.1 10^3/UL (ref 0.5–4.7)
LYMPHOCYTES NFR BLD AUTO: 46.8 % (ref 13–45)
MCH RBC QN AUTO: 32.5 PG (ref 27–33.4)
MCHC RBC AUTO-ENTMCNC: 35.3 G/DL (ref 32–36)
MCV RBC AUTO: 92 FL (ref 80–97)
METHADONE UR QL SCN: NEGATIVE
MONOCYTES # BLD AUTO: 0.4 10^3/UL (ref 0.1–1.4)
MONOCYTES NFR BLD AUTO: 6.8 % (ref 3–13)
NEUTROPHILS # BLD AUTO: 2.9 10^3/UL (ref 1.7–8.2)
NEUTS SEG NFR BLD AUTO: 44.7 % (ref 42–78)
NITRITE UR QL STRIP: NEGATIVE
PCP UR QL SCN: NEGATIVE
PH UR STRIP: 6 [PH] (ref 5–9)
PLATELET # BLD: 265 10^3/UL (ref 150–450)
POTASSIUM SERPL-SCNC: 4.3 MMOL/L (ref 3.6–5)
PROT SERPL-MCNC: 8.5 G/DL (ref 6.3–8.2)
PROT UR STRIP-MCNC: NEGATIVE MG/DL
RBC # BLD AUTO: 5.21 10^6/UL (ref 4.35–5.55)
SALICYLATES SERPL-MCNC: < 1 MG/DL (ref 2–20)
SODIUM SERPL-SCNC: 152.7 MMOL/L (ref 137–145)
SP GR UR STRIP: 1
TOTAL CELLS COUNTED % (AUTO): 100 %
URINE AMPHETAMINES SCREEN: NEGATIVE
URINE BENZODIAZEPINES SCREEN: NEGATIVE
URINE COCAINE SCREEN: NEGATIVE
URINE MARIJUANA (THC) SCREEN: (no result)
UROBILINOGEN UR-MCNC: NEGATIVE MG/DL (ref ?–2)
WBC # BLD AUTO: 6.6 10^3/UL (ref 4–10.5)

## 2018-07-09 PROCEDURE — 99285 EMERGENCY DEPT VISIT HI MDM: CPT

## 2018-07-09 PROCEDURE — 80307 DRUG TEST PRSMV CHEM ANLYZR: CPT

## 2018-07-09 PROCEDURE — 80053 COMPREHEN METABOLIC PANEL: CPT

## 2018-07-09 PROCEDURE — 85025 COMPLETE CBC W/AUTO DIFF WBC: CPT

## 2018-07-09 PROCEDURE — 36415 COLL VENOUS BLD VENIPUNCTURE: CPT

## 2018-07-09 PROCEDURE — 81001 URINALYSIS AUTO W/SCOPE: CPT

## 2018-07-09 NOTE — ER DOCUMENT REPORT
ED Psych Disorder / Suicide





- General


Mode of Arrival: Ambulatory


Information source: Patient


TRAVEL OUTSIDE OF THE [place] IN LAST 30 DAYS: No





<ISAAC ARAGON - Last Filed: 07/09/18 21:39>





<MAGALYS LÓPEZ - Last Filed: 07/10/18 11:34>





<KARLASANDY ZHOU - Last Filed: 07/10/18 11:50>





- General


Chief Complaint: Psych Problem


Stated Complaint: SUICIDAL IDEATION


Time Seen by Provider: 07/09/18 21:37


Notes: 





Chief complaint: Suicidal








History of complain:( obtained from----patient) 27 years old male on disability

, staying in a hotel, drinking alcoholic drinks, feeling more depressed than 

usual and want to end his life by cutting his wrist.  These thoughts are coming 

on therefore came to the ER to be helped.  He has previous thoughts like this.








Onset: Gradual


Duration: Last few days


Severity: Severe


Quality: Not applicable


Context: Depression


Exacerbating factor and relieving factors: Not applicable











REVIEW OF SYSTEMS:


CONSTITUTIONAL :  Denies fever,  chills, or sweats.  Denies recent illness.


EENT:   Denies eye, ear, throat, or mouth pain or symptoms.  Denies nasal or 

sinus congestion or discharge.  Denies throat, tongue, or mouth swelling or 

difficulty swallowing.


CARDIOVASCULAR:  Denies chest pain.  Denies palpitations or racing or irregular 

heart beat.  Denies ankle edema.


RESPIRATORY:  Denies cough, cold, or chest congestion.  Denies shortness of 

breath, difficulty breathing, or wheezing.


GASTROINTESTINAL:  Denies  distention.  Denies nausea, vomiting, or diarrhea.  

Denies blood in vomitus, stools, or per rectum.  Denies black, tarry stools.  

Denies constipation. 


GENITOURINARY:  Denies difficulty urinating, painful urination, burning, 

frequency, blood in urine, or discharge.


FEMALE  GENITOURINARY:  Denies vaginal bleeding, heavy or abnormal periods, 

irregular periods.  Denies vaginal discharge or odor. 


MUSCULOSKELETAL:  Denies back or neck pain or stiffness.  Denies joint pain or 

swelling.


SKIN:   Denies rash, lesions or sores.


HEMATOLOGIC :   Denies easy bruising or bleeding.


LYMPHATIC:  Denies swollen, enlarged glands.


NEUROLOGICAL:  Denies confusion or altered mental status.  Denies passing out 

or loss of consciousness.  Denies dizziness or lightheadedness.  Denies 

headache.  Denies weakness or paralysis or loss of use of either side.  Denies 

problems with gait or speech.  Denies sensory loss, numbness, or tingling.  

Denies seizures.


PSYCHIATRIC:  Denies anxiety or stress.  Denies depression, suicidal ideation, 

or homicidal ideation.





ALL OTHER SYSTEMS REVIEWED AND NEGATIVE.











PHYSICAL EXAMINATION:





GENERAL: Well-appearing, well-nourished and in no acute distress.





HEAD: Atraumatic, normocephalic.





EYES: Pupils equal round and reactive to light, extraocular movements intact, 

conjunctiva are normal.





ENT: Nares patent, oropharynx clear without exudates.  Moist mucous membranes.





NECK: Normal range of motion, supple without lymphadenopathy





LUNGS: Breath sounds clear to auscultation bilaterally and equal.  No wheezes 

rales or rhonchi.





HEART: Regular rate and rhythm without murmurs





ABDOMEN: Soft, nontender, nondistended abdomen.  No guarding, no rebound.  No 

masses appreciated.





Examination of genitals-deferred





Musculoskeletal: Normal range of motion, no pitting or edema.  No cyanosis.





NEUROLOGICAL: Cranial nerves grossly intact.  Normal speech, normal gait.  

Normal sensory, motor exams





PSYCH: Depressed and suicidal.





SKIN: Warm, Dry, normal turgor, no rashes or lesions noted.

















Dictation was performed using Dragon voice recognition software (ISAAC ARAGON)





- Related Data


Allergies/Adverse Reactions: 


 





cefaclor [From Ceclor] Allergy (Verified 05/29/18 08:22)


 











Past Medical History





- Social History


Smoking Status: Current Every Day Smoker


Cigarette use (# per day): No


Chew tobacco use (# tins/day): No


Frequency of alcohol use: Heavy


Drug Abuse: None


Family History: Reviewed & Not Pertinent


Patient has suicidal ideation: No


Patient has homicidal ideation: No





- Past Medical History


Cardiac Medical History: Reports: Hx Hypertension


Renal/ Medical History: Denies: Hx Peritoneal Dialysis


Psychiatric Medical History: Reports: Hx Bipolar Disorder, Hx Depression - with 

anxiety





- Immunizations


Hx Diphtheria, Pertussis, Tetanus Vaccination: Yes





<ISAAC ARAGON - Last Filed: 07/09/18 21:39>





Review of Systems





<ISAAC ARAGON - Last Filed: 07/09/18 21:39>





<MAGALYS LÓPEZ - Last Filed: 07/10/18 11:34>





<SANDY CORADO - Last Filed: 07/10/18 11:50>





- Review of Systems


Notes: 





Dictated (ISAAC ARAGON)





Physical Exam





<ISAAC ARAGON - Last Filed: 07/09/18 21:39>





<MAGALYS LÓPEZ - Last Filed: 07/10/18 11:34>





<SANDY CORADO - Last Filed: 07/10/18 11:50>





- Vital signs


Vitals: 





 











Temp Pulse Resp BP Pulse Ox


 


 99.1 F   111 H  16   140/94 H  95 


 


 07/09/18 21:22  07/09/18 21:22  07/09/18 21:22  07/09/18 21:22  07/09/18 21:22














- Notes


Notes: 





Dictated (ISAAC ARAGON)





Course





- Laboratory


Result Diagrams: 


 07/09/18 21:45





 07/09/18 21:45





<MAGALYS LÓPEZ - Last Filed: 07/10/18 11:34>





- Laboratory


Result Diagrams: 


 07/09/18 21:45





 07/09/18 21:45





<SANDY CORADO - Last Filed: 07/10/18 11:50>





- Vital Signs


Vital signs: 





 











Temp Pulse Resp BP Pulse Ox


 


 99.1 F   90   16   114/60   94 


 


 07/09/18 21:22  07/10/18 05:00  07/10/18 05:00  07/10/18 05:00  07/10/18 05:00














- Laboratory


Laboratory results interpreted by me: 





 











  07/09/18 07/09/18





  21:45 21:45


 


RDW  14.6 H 


 


Lymphocytes %  46.8 H 


 


Sodium   152.7 H


 


Chloride   108 H


 


Total Protein   8.5 H


 


Salicylates   < 1.0 L


 


Acetaminophen   < 10 L


 


Serum Alcohol   317 H*














Discharge





<ISAAC ARAGON - Last Filed: 07/09/18 21:39>





<MAGALYS LÓPEZ - Last Filed: 07/10/18 11:34>





<SANDY CORADO - Last Filed: 07/10/18 11:50>





- Discharge


Clinical Impression: 


 Alcohol abuse, Anxiety, Chronic passive suicidal ideation





Depression


Qualifiers:


 Depression Type: major depressive disorder Major depression recurrence: 

recurrent Active/Remission status: currently active Major depression episode 

severity: severe Psychotic features: with psychotic features Qualified Code(s): 

F33.3 - Major depressive disorder, recurrent, severe with psychotic symptoms





Clinical Impression: 


 (Ruled Out): Suicidal behavior


Condition: Stable


Disposition: HOME, SELF-CARE


Additional Instructions: 


DEPRESSION:


     Your evaluation reveals that you have mental depression. While symptoms 

may be vague, they often include disturbance of sleep, fatigue, loss of appetite

, and general loss of interest in life.  While depression may be a side effect 

of drugs, or a reaction to a major change in your life, many cases have no 

known cause.


     If depression is acute, and related to a major loss in your life, you can 

expect it to clear completely with time.  If you have been depressed a long time

, are prone to repeated bouts of depression or low mood, or have been thinking 

of suicide, get help.


     Depression can be treated with anti-depressant medication and counselling.

  Long-term depression will often take a few weeks to clear, even with 

appropriate medication.  Follow-up care is important.








SUICIDAL IDEATION:


     Suicidal ideation is a common medical term for thoughts about suicide, 

which may be as detailed as a formulated plan, without the suicidal act itself. 

Although most people who undergo suicidal ideation do not commit suicide, some 

go on to make suicide attempts. The range of suicidal ideation varies greatly 

from fleeting to detailed planning, role playing, and unsuccessful attempts.





     While thoughts about suicide are common, most people do not carry out 

serious actions to commit suicide.  Based upon your evaluation and discussion 

with you, we do not believe you are currently at risk to act upon your thoughts 

of suicide.  You have agreed to return to the Emergency Department, at any time

, if you feel inclined to act upon your suicidal thoughts.





Acute Alcohol Intoxication





     Your evaluation revealed very high levels of alcohol.  You can die from 

drinking a large amount of alcohol rapidly!  Further, there's the risk of falls

, traffic accidents, and fights.  A high portion (about 50 percent) of the 

serious injuries seen in hospital emergency rooms are caused by alcohol.


     Alcohol overdosage is usually due to an underlying emotional or 

psychiatric problem.  You may benefit from counselling.


     If "binge" drinking is an ongoing problem for you, or if you drink ANY 

AMOUNT of alcohol EVERY day, you most likely have a tendency to alcoholism.  

You should avoid alcohol totally.  We can refer you for treatment.  Persons 

with alcohol problems are often also prone to other addictions -- you should 

discuss any use of medications or drugs with the doctor.


     You should be watched at home for the next several hours by someone who 

has not been drinking. Get extra fluids for the next 24 hours.  Call the doctor 

if there is repeated vomiting, increasing headache, decreasing level of 

alertness, or any other worsening.





FOLLOW-UP CARE:


[facility] at 2 PM.  It is also recommended to continue 

services with [facility] for assistance in obtaining detox bed. if you 

experience worsening or a significant change in your symptoms, notify the 

physician immediately or return to the Emergency Department at any time for re-

evaluation.








Please take the Librium as prescribed.  If you cannot afford the Librium and 

develop severe symptoms of withdrawal please return to the ED.  


Prescriptions: 


Chlordiazepoxide HCl [Librium 25 mg Capsule] 1 cap PO ASDIR PRN #30 capsule


 PRN Reason: 


Referrals: 


[facility] [Outside] - Follow up as needed


[facility] [Provider Group] - 07/24/18 2:00 pm

## 2018-07-10 VITALS — SYSTOLIC BLOOD PRESSURE: 114 MMHG | DIASTOLIC BLOOD PRESSURE: 60 MMHG

## 2018-07-10 NOTE — PSYCHOLOGICAL NOTE
Psych Note





- Psych Note


Psych Note: 


Reason for Consult: suicidal ideation; alcoholism





Patient presented to [facility] ED with [facility] crisis worker. Patient called the crisis 

line for suicidal thoughts and reports 2 serious attempts in past.  He also has 

a history of cutting himself.





Patient disclosed that he called [facility] because he was 

"suicidal."  He states he is "tired of slowly killing myself with alcohol" it 

would like to obtain and maintain sobriety.  Patient is unable to clearly 

identify his trigger only stating he has been living in hotel for the last 2 

months and that "I am over it ... I am initially situation I cannot get out of.

"  He discloses that while [facility] and [facility] for 

disability is unable to obtain housing because "no one will rent to me because 

of my previous eviction."  He reports that he was wrongly evicted because the 

 was scared of him because he open carried and that she went so 

far as to put a restraining order on him.  Patient denies any reason for her to 

do this stating "the bitch single-handedly ruined my life."  He denies 

homicidal ideation just anger at the situation.  Patient disclosed that he has 

been in [place] for approximately 1 year he states he came to [place] 

because he was "running from his issues in [place]... Thought to come here to 

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 

suicidal ideation with a history of cutting as a maladaptive coping skill.  

Clinician observes multiple scars on both of patient's forearms.  Patient 

denies having any current friends but one that he does drugs with.  He 

discloses and 1 inpatient psychiatric treatment a "long time ago" and states 

that he has been inpatient subsidies treatment multiple times with the last one 

being in March and [place], [place].  He reports that [facility] 

[facility].





Patient is alert and orientated to person, place, time and circumstance.  Mood 

is euthymic with congruent affect.  Patient endorses chronic passive suicidal 

ideation i.e. no plans means or intent.  Reports not wanting to drink himself 

to death.  Patient denies homicidal ideation.  Delusions are absent behaviors 

congruent with an intact reality based presentation i.e. organized and linear 

thought process.  Eye contact is fair.  Conversational speech was within normal 

rate, tone and prosody.  Intellectual abilities appear to be within the average 

range.  Attention and concentration are fair.  Insight, judgment, impulse 

control are poor due to substance abuse.





Behavior health team was contacted by integrated , [facility].  He reports he will start looking for detox placement for the patient.





[facility]  Patient has appointment on July 24, 2018 at 2 PM for his continued mental health and substance abuse treatment. 





No medication recommendations at this time





Diagnosis


303.90 (F10.20) alcohol use disorder; severe


311 (F32.9) unspecified depressive disorder per history provided by patient


300.00 (F41.9) unspecified anxiety disorder per history provided by patient 





Impression\plan: Patient is cleared from acute psychiatric services.  Patient 

does not meet IVC criteria per [place] GS 122C.  Patient has chronic passive 

suicidal ideation i.e. no plans means or intent.  Patient is diagnosed with 

alcohol use disorder and is requesting assistance in sobriety.  Patient 

[facility]; he next appointment is 07/24/ 2018 at 2pm.  Patient is recommended for a soft handoff to mobile crisis 

[facility] for continued assistance in obtaining detox.  Dr. Connelly was consulted and the care management this patient; attending physician 

is agreement with recommendations and disposition.

## 2018-07-10 NOTE — ER DOCUMENT REPORT
Doctor's Note


Notes: 





07/10/18 10:04


Pt reports that he is doing better today. He reports that he knows he is here 

because of visual hallucinations which he is no longer experiencing. Pt admits 

that he has attempted suicide in the past. He also admits that he has expressed 

some interest in a detox program and is comfortable with going to an inpatient 

rehab facility but knows that he has to be in court within the next week for a 

DUI and states that he has already had it postponed from being in rehab 

previously.





Pt currently states that he diaphoretic and shaky. He reports a hx of 

withdrawal with vomiting and hallucinations but denies any seizures or needing 

to be on a ventilator with previous withdrawals. 











PHYSICAL EXAM





GENERAL: Alert. No acute distress.





LUNGS: Clear to auscultation bilaterally, no wheezes, rales, or rhonchi. No 

respiratory distress.





HEART: Regular rate and rhythm. No murmurs, gallops, or rubs.





ABDOMEN: Soft, non-tender. Non-distended. Bowel sounds present in all 4 

quadrants. No guarding, rebound, or rigidity.





EXTREMITIES: Moves all 4 extremities spontaneously. No tremor.





NEUROLOGICAL: Alert and oriented x3. Normal speech. No tremor.





PSYCH: Normal mood, Normal affect. 





SKIN: Warm. Mildly diaphoretic. Well healed scars to the dorsal aspect of 

bilateral forearms. (JOYCE QUIÑONES)








07/10/18 19:56


Patient has been set up for follow-up with [facility], patient's been given 

outpatient resources for detox.  Patient had some vomiting here however we did 

not see any of it, patient was given Zofran by mouth.  Patient was observed 

sticking his finger down his throat.  I did not see any actual evidence of 

vomit.  Patient has no evidence of withdrawals.  Patient is discharged to home. 

(SANDY CORADO)





Scribe Documentation





- Scribe


Written by [place]:: Joyce Quiñones, [place] 7/10/18 1141


acting as scribe for :: Irasema

## 2018-08-13 ENCOUNTER — HOSPITAL ENCOUNTER (EMERGENCY)
Dept: HOSPITAL 62 - ER | Age: 28
Discharge: HOME | End: 2018-08-13
Payer: OTHER GOVERNMENT

## 2018-08-13 VITALS — DIASTOLIC BLOOD PRESSURE: 97 MMHG | SYSTOLIC BLOOD PRESSURE: 148 MMHG

## 2018-08-13 DIAGNOSIS — F10.10: Primary | ICD-10-CM

## 2018-08-13 DIAGNOSIS — F12.10: ICD-10-CM

## 2018-08-13 DIAGNOSIS — F15.10: ICD-10-CM
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
## 2018-08-13 DIAGNOSIS — R10.13: ICD-10-CM

## 2018-08-13 DIAGNOSIS — F17.200: ICD-10-CM

## 2018-08-13 DIAGNOSIS — I10: ICD-10-CM

## 2018-08-13 DIAGNOSIS — F14.10: ICD-10-CM

## 2018-08-13 DIAGNOSIS — E86.0: ICD-10-CM

## 2018-08-13 DIAGNOSIS — R45.851: ICD-10-CM

## 2018-08-13 DIAGNOSIS — Z88.1: ICD-10-CM

## 2018-08-13 LAB
ADD MANUAL DIFF: NO
ALBUMIN SERPL-MCNC: 5.1 G/DL (ref 3.5–5)
ALP SERPL-CCNC: 89 U/L (ref 38–126)
ALT SERPL-CCNC: 45 U/L (ref 21–72)
ANION GAP SERPL CALC-SCNC: 13 MMOL/L (ref 5–19)
APAP SERPL-MCNC: < 10 UG/ML (ref 10–30)
APPEARANCE UR: CLEAR
APTT PPP: YELLOW S
AST SERPL-CCNC: 50 U/L (ref 17–59)
BARBITURATES UR QL SCN: NEGATIVE
BASOPHILS # BLD AUTO: 0.1 10^3/UL (ref 0–0.2)
BASOPHILS NFR BLD AUTO: 0.8 % (ref 0–2)
BILIRUB DIRECT SERPL-MCNC: 0.6 MG/DL (ref 0–0.4)
BILIRUB SERPL-MCNC: 1.9 MG/DL (ref 0.2–1.3)
BILIRUB UR QL STRIP: NEGATIVE
BUN SERPL-MCNC: 9 MG/DL (ref 7–20)
CALCIUM: 10.5 MG/DL (ref 8.4–10.2)
CHLAM PCR: NOT DETECTED
CHLORIDE SERPL-SCNC: 99 MMOL/L (ref 98–107)
CO2 SERPL-SCNC: 29 MMOL/L (ref 22–30)
EOSINOPHIL # BLD AUTO: 0 10^3/UL (ref 0–0.6)
EOSINOPHIL NFR BLD AUTO: 0.2 % (ref 0–6)
ERYTHROCYTE [DISTWIDTH] IN BLOOD BY AUTOMATED COUNT: 13.4 % (ref 11.5–14)
ETHANOL SERPL-MCNC: < 10 MG/DL
GLUCOSE SERPL-MCNC: 110 MG/DL (ref 75–110)
GLUCOSE UR STRIP-MCNC: NEGATIVE MG/DL
GON PCR: NOT DETECTED
HCT VFR BLD CALC: 48.8 % (ref 37.9–51)
HGB BLD-MCNC: 17.2 G/DL (ref 13.5–17)
KETONES UR STRIP-MCNC: 20 MG/DL
LYMPHOCYTES # BLD AUTO: 1.2 10^3/UL (ref 0.5–4.7)
LYMPHOCYTES NFR BLD AUTO: 17.8 % (ref 13–45)
MCH RBC QN AUTO: 31.8 PG (ref 27–33.4)
MCHC RBC AUTO-ENTMCNC: 35.3 G/DL (ref 32–36)
MCV RBC AUTO: 90 FL (ref 80–97)
METHADONE UR QL SCN: NEGATIVE
MONOCYTES # BLD AUTO: 0.5 10^3/UL (ref 0.1–1.4)
MONOCYTES NFR BLD AUTO: 7.6 % (ref 3–13)
NEUTROPHILS # BLD AUTO: 5.2 10^3/UL (ref 1.7–8.2)
NEUTS SEG NFR BLD AUTO: 73.6 % (ref 42–78)
NITRITE UR QL STRIP: NEGATIVE
PCP UR QL SCN: NEGATIVE
PH UR STRIP: 7 [PH] (ref 5–9)
PLATELET # BLD: 216 10^3/UL (ref 150–450)
POTASSIUM SERPL-SCNC: 4.3 MMOL/L (ref 3.6–5)
PROT SERPL-MCNC: 8.8 G/DL (ref 6.3–8.2)
PROT UR STRIP-MCNC: NEGATIVE MG/DL
RBC # BLD AUTO: 5.42 10^6/UL (ref 4.35–5.55)
SALICYLATES SERPL-MCNC: < 1 MG/DL (ref 2–20)
SODIUM SERPL-SCNC: 141.4 MMOL/L (ref 137–145)
SP GR UR STRIP: 1.01
TOTAL CELLS COUNTED % (AUTO): 100 %
URINE AMPHETAMINES SCREEN: NEGATIVE
URINE BENZODIAZEPINES SCREEN: NEGATIVE
URINE COCAINE SCREEN: NEGATIVE
URINE MARIJUANA (THC) SCREEN: (no result)
UROBILINOGEN UR-MCNC: 2 MG/DL (ref ?–2)
WBC # BLD AUTO: 7 10^3/UL (ref 4–10.5)

## 2018-08-13 PROCEDURE — 81001 URINALYSIS AUTO W/SCOPE: CPT

## 2018-08-13 PROCEDURE — 76705 ECHO EXAM OF ABDOMEN: CPT

## 2018-08-13 PROCEDURE — 80307 DRUG TEST PRSMV CHEM ANLYZR: CPT

## 2018-08-13 PROCEDURE — 83735 ASSAY OF MAGNESIUM: CPT

## 2018-08-13 PROCEDURE — 96361 HYDRATE IV INFUSION ADD-ON: CPT

## 2018-08-13 PROCEDURE — S0119 ONDANSETRON 4 MG: HCPCS

## 2018-08-13 PROCEDURE — 93010 ELECTROCARDIOGRAM REPORT: CPT

## 2018-08-13 PROCEDURE — 87591 N.GONORRHOEAE DNA AMP PROB: CPT

## 2018-08-13 PROCEDURE — 93976 VASCULAR STUDY: CPT

## 2018-08-13 PROCEDURE — 85025 COMPLETE CBC W/AUTO DIFF WBC: CPT

## 2018-08-13 PROCEDURE — 80053 COMPREHEN METABOLIC PANEL: CPT

## 2018-08-13 PROCEDURE — 87491 CHLMYD TRACH DNA AMP PROBE: CPT

## 2018-08-13 PROCEDURE — 36415 COLL VENOUS BLD VENIPUNCTURE: CPT

## 2018-08-13 PROCEDURE — 93005 ELECTROCARDIOGRAM TRACING: CPT

## 2018-08-13 PROCEDURE — 96360 HYDRATION IV INFUSION INIT: CPT

## 2018-08-13 PROCEDURE — 87086 URINE CULTURE/COLONY COUNT: CPT

## 2018-08-13 PROCEDURE — 99285 EMERGENCY DEPT VISIT HI MDM: CPT

## 2018-08-13 NOTE — ER DOCUMENT REPORT
ED General





<MAGALYS LÓPEZ - Last Filed: 08/13/18 16:08>





- General


Mode of Arrival: Medic


Information source: Patient


TRAVEL OUTSIDE OF THE [place] IN LAST 30 DAYS: No





<TONG LUNA - Last Filed: 08/14/18 15:15>





- General


Chief Complaint: ETOH Abuse


Stated Complaint: ALCOHOL WITHDRAWALS


Time Seen by Provider: 08/13/18 10:25





- HPI


Notes: 





27-year-old male with a history with a history of alcohol abuse, depressive 

disorder and anxiety presents for evaluation of alcohol withdrawal states his 

last drink was at 0600 this morning.  Patient states that he typically drinks 

half a bottle of whiskey a day, also states that he used meth yesterday around 

noon.  States he is having some visual hallucinations, states that the "lost 

everything".  Patient reports that he wanted to die per EMS but does not have a 

plan to kill himself, denies any homicidal ideation.  Patient reports he has 

epigastric pain.  [facility], he rescheduled his appointment for August 15 at 2 

PM.  Denies fevers, chills,  chest pain,palpitations,  shortness of breath, 

dyspnea, nausea, vomiting, diarrhea, hematuria,blurred vision, double vision, 

loss of vision, speech changes, LH, dizziness, syncope, headaches, wheezing, ST

, URI, neck pain, weakness, bowel or bladder dysfunction, saddle anesthesia, 

numbness or tingling in bilateral upper or lower extremities equally, muscle 

paralysis, weakness in bilateral upper or lower extremities equally or rash. (

TONG LUNA)





- Related Data


Allergies/Adverse Reactions: 


 





cefaclor [From Ceclor] Allergy (Verified 08/13/18 09:57)


 











Past Medical History





- General


Information source: Patient





- Social History


Smoking Status: Current Every Day Smoker


Chew tobacco use (# tins/day): No


Frequency of alcohol use: Heavy


Drug Abuse: Cocaine, Marijuana, Methamphetamine


Family History: Reviewed & Not Pertinent


Patient has suicidal ideation: Yes


Patient has homicidal ideation: No





- Past Medical History


Cardiac Medical History: Reports: Hx Hypertension


Renal/ Medical History: Denies: Hx Peritoneal Dialysis


Psychiatric Medical History: Reports: Hx Bipolar Disorder, Hx Depression - with 

anxiety





- Immunizations


Hx Diphtheria, Pertussis, Tetanus Vaccination: Yes





<TONG LUNA - Last Filed: 08/14/18 15:15>





Review of Systems





- Review of Systems


Constitutional: No symptoms reported


EENT: No symptoms reported


Cardiovascular: No symptoms reported


Respiratory: No symptoms reported


Gastrointestinal: See HPI


Genitourinary: No symptoms reported


Male Genitourinary: No symptoms reported


Musculoskeletal: No symptoms reported


Skin: No symptoms reported


Hematologic/Lymphatic: No symptoms reported


Neurological/Psychological: See HPI





<TONG LUNA - Last Filed: 08/14/18 15:15>





Physical Exam





<LÓPEZMAGALYS - Last Filed: 08/13/18 16:08>





<TONG LUNA - Last Filed: 08/14/18 15:15>





- Vital signs


Vitals: 


 











Temp Pulse Resp BP Pulse Ox


 


 98.9 F   87   18   141/92 H  97 


 


 08/13/18 09:57  08/13/18 09:57  08/13/18 09:57  08/13/18 09:57  08/13/18 09:57














- Notes


Notes: 





PHYSICAL EXAMINATION:





GENERAL: Well-appearing, well-nourished and in no acute distress.





HEAD: Atraumatic, normocephalic.





EYES: Pupils equal round and reactive to light, extraocular movements intact, 

sclera anicteric, conjunctiva are normal.





ENT: Nares patent, oropharynx clear without exudates.  Moist mucous membranes.





NECK: Normal range of motion, supple without lymphadenopathy





LUNGS: Breath sounds clear to auscultation bilaterally and equal.  No wheezes 

rales or rhonchi.





HEART: Regular rate and rhythm without murmurs





ABDOMEN: Soft,  nondistended abdomen.  Epigastric tenderness on palpation.  no 

guarding, no rebound.  No masses appreciated.





Musculoskeletal: Normal range of motion, no pitting or edema.  No cyanosis.





NEUROLOGICAL: Cranial nerves grossly intact.  Normal speech, normal gait.  

Normal sensory, motor exams 





PSYCH: Normal mood, normal affect.  Reports visual hallucinations.





SKIN: Warm, Dry, normal turgor, no rashes or lesions noted. (TONG LUNA)





Course





- Laboratory


Result Diagrams: 


 08/13/18 11:05





 08/13/18 11:05





<MAGALYS LÓPEZ - Last Filed: 08/13/18 16:08>





- Laboratory


Result Diagrams: 


 08/13/18 11:05





 08/13/18 11:05





<TONG LUNA - Last Filed: 08/14/18 15:15>





- Re-evaluation


Re-evalutation: 





08/13/18 17:42


27-year-old male with a medical history of alcohol withdrawal, chronic passive 

suicidal ideation with depression and anxiety presents to the emergency room 

for complaints of alcohol withdrawal.  Patient's serum alcohol level is less 

than 10 currently.  Total bilirubin 1.9, direct bilirubin 0.678. Will obtain a 

abdomen right upper quadrant to assess for any sludge or thickening of 

gallbladder.  Mental health at bedside to evaluate patient.  Given IV fluids to 

help with dehydration due to ketones in urinalysis.  Noted urinalysis, GC 

negative.  Will culture urine.  Ultrasound unable to take this time due to not 

being able to get a hold her nurse to have patient be brought over.  Patient 

given 2 L of IV fluids, patient resolved tachycardia.  Mental health at bedside 

and felt that patient was appropriate for discharge [facility]. on reevaluation,  patient states he is feeling much better.  He is afebrile 

vitals stable and noticed ultrasound negative for any acute findings.  [facility].   Pt reports he does 

have good support at home.  After performing a Medical Screening Examination, I 

estimate there is LOW risk for ACUTE APPENDICITIS, BOWEL OBSTRUCTION, ACUTE 

CHOLECYSTITIS, PERFORATED DIVERTICULITIS, INCARCERATED HERNIA, PANCREATITIS, 

TESTICULAR TORSION or PERFORATED ULCER, thus I consider the discharge 

disposition reasonable. Also, there is no evidence or peritonitis, sepsis, or 

toxicity.  I have reevaluated this patient multiple times and no significant 

life threatening changes are noted. The patient and I have discussed the 

diagnosis and risks, and we agree with discharging home with close follow-up 

with the understanding that symptoms and presentations can change. We also 

discussed returning to the Emergency Department immediately if new or worsening 

symptoms occur. We have discussed the symptoms which are most concerning (e.g., 

bloody stool, fever, changing or worsening pain, intractable vomiting - 

standard verbal up date) that necessitate immediate return.


08/14/18 15:15


 (TONG LUNA)





- Vital Signs


Vital signs: 


 











Temp Pulse Resp BP Pulse Ox


 


 98.9 F   87   14   148/97 H  96 


 


 08/13/18 09:57  08/13/18 09:57  08/13/18 16:00  08/13/18 14:01  08/13/18 16:00














- Laboratory


Laboratory results interpreted by me: 


 











  08/13/18 08/13/18 08/13/18





  11:05 11:05 11:05


 


Hgb  17.2 H  


 


Calcium   10.5 H 


 


Total Bilirubin   1.9 H 


 


Direct Bilirubin   0.6 H 


 


Total Protein   8.8 H 


 


Albumin   5.1 H 


 


Urine Ketones    20 H


 


Urine Urobilinogen    2.0 H


 


Ur Leukocyte Esterase    TRACE H


 


Salicylates   < 1.0 L 


 


Acetaminophen   < 10 L 














Discharge





<MAGALYS LÓPEZ - Last Filed: 08/13/18 16:08>





<TONG LUNA - Last Filed: 08/14/18 15:15>





- Discharge


Clinical Impression: 


 Alcohol abuse, Upper abdominal pain





Condition: Stable


Disposition: HOME, SELF-CARE


Instructions:  Low-Fat Diet ([facility])


Additional Instructions: 





Ultrasound of the right upper quadrant negative for any acute findings.  Follow 

a low-salt diet.  Up with gastroenterologist for epigastric pain.





You have been evaluated by both medical and behavioral health teams that have 

been deemed appropriate for discharge.  It is recommended you continue your 

services with your [facility] for your 

continued mental health and substance abuse treatment.





Alcohol Withdrawal





     Your symptoms are caused by alcohol withdrawal. After a period of frequent 

drinking, the brain and body are changed by the alcohol. When you quit or 

reduce your drinking, the nervous system becomes unstable. Withdrawal symptoms 

can start a few hours after your last drink, but sometimes don't begin until a 

couple of days later. Symptoms can include shakiness, sweating, insomnia, nausea

, vomiting, fearfulness, hallucinations, and seizures.


     In addition to the acute effects of alcohol withdrawal, we often have to 

deal with the medical effects of alcoholism. These problems often include 

dehydration, stomach irritation, intestinal bleeding, low blood sugar, liver 

disease, and pancreas inflammation.


     Treatment for alcohol withdrawal includes mild sedatives, vitamins, and 

fluids. You need to be with someone who can help if symptoms become severe. 

Many patients can withdraw at home. Admission to the hospital or a detox 

facility may be necessary if withdrawal symptoms are severe and uncontrollable.


     Abstaining from alcohol is the only effective long-term treatment. If you 

start drinking again, you will not be able to control yourself after the first 

drink. Treatment programs are available. In addition, many alcoholics benefit 

from Alcoholics Anonymous or other support groups available through your 

counselor or [employer] . [employer] and [employer] are support groups for 

friends and family members of an alcoholic.


     





Return to the emergency room if you develop persistent vomiting, severe 

abdominal pain, fever, shortness of breath, hallucinations, uncontrollable 

tremors, or seizures.





Follow-up with primary care provider within 3 days.  





Return immediately for any new or worsening symptoms.





Follow up with primary care provider, call tomorrow to make followup 

appointment.





Prescriptions: 


Omeprazole 20 mg PO DAILY #20 tablet.dr


Referrals: 


[facility] [Provider Group] - 08/15/18 2:00 pm


[facility] [Outside] - Follow up as needed


TAMMY VALVERDE MD [NO LOCAL MD] - Follow up as needed


ELIZABET LUCIO MD [ACTIVE STAFF] - Follow up in 1 week

## 2018-08-13 NOTE — PSYCHOLOGICAL NOTE
Psych Note





- Psych Note


Psych Note: 


Reason for Consult: substance abuse


Consent Permissions: refused





Pt presented to the ED via EMS with complaints of alcohol withdrawl. Per EMS pt 

last drink was at 0600 this morning. Pt reports drinking 5 beers in last 24 

hours and reports using meth yesterday around noon. Pt reports hallucinations. 

EMS reports that pt states that he wants to die but does not want to kill his 

self or hurt others.





Patient discloses that he came in because of alcohol withdrawal symptoms to 

include seeing "the walls breathe."  Patient reports this is occurred 

previously one other time when he withdrawal from alcohol.  He continues to 

state that most of his social support is up in [place] and does plan to return 

there however is unable to until his legal issues have been resolved.  He 

reports that he has 3 open cases which include 2 counts of DUIs and one count 

of felony possession of marijuana.  Patient states he has [facility] and disclosed [facility] abuse 

treatment in spring through both [facility] and then the [facility] in 

[place], [place].  Patient denies current thoughts of suicidal ideation but admits 

to having chronic thoughts that come and go.  Patient denies any previous 

attempts at self-harm.





Patient is alert and orientated to person, place, time and circumstance.  Mood 

is euthymic with congruent affect.  Patient endorses chronic passive suicidal 

ideation i.e. no plans means or intent.  Reports not wanting to drink himself 

to death.  Patient denies homicidal ideation.  Delusions are absent behaviors 

congruent with an intact reality based presentation i.e. organized and linear 

thought process.  Eye contact is fair.  Conversational speech was within normal 

rate, tone and prosody.  Intellectual abilities appear to be within the average 

range.  Attention and concentration are fair.  Insight, judgment, impulse 

control are poor due to substance abuse.





[facility].  Patient had appointment on July 24, 2018 at 2 PM set up by the behavioral health team for his continued mental 

health and substance abuse treatment; however, the patient cancelled and 

rescheduled for August 15, 2018 at 2pm. 





No medication recommendations at this time





Diagnosis


303.90 (F10.20) alcohol use disorder; severe


311 (F32.9) unspecified depressive disorder per history provided by patient


300.00 (F41.9) unspecified anxiety disorder per history provided by patient 





Impression\plan: Patient is cleared from acute psychiatric services.  Patient 

does not meet IVC criteria per [place] 122C.  Patient has chronic passive 

suicidal ideation i.e. no plans means or intent.  [facility] 

[facility]; his next appointment is 08/15/2018 at 2pm.  

Patient disclosed concern that he can not go inpatient for substance abuse 

because he has upcoming court dates.  He reports he would like to go long term 

inpatient after he clears up his legal issues.  Clinician discussed the option 

of [facility] with the [facility] to get a [facility] for intensive outpatient substance 

abuse services such as the ones they hold at [facility].  Dr. Connelly was consulted 

and the care management this patient; attending physician is agreement with 

recommendations and disposition.

## 2018-08-13 NOTE — RADIOLOGY REPORT (SQ)
EXAM DESCRIPTION:  U/S ABDOMEN LTD W/DOPPLER



COMPLETED DATE/TIME:  8/13/2018 6:25 pm



REASON FOR STUDY:  upper abdomen abd pain



COMPARISON:  None.



TECHNIQUE:  Dynamic and static grayscale images acquired of the abdomen and recorded on PACS. Nattyo
alberto selected color Doppler and spectral images recorded.



LIMITATIONS:  None.



FINDINGS:  PANCREAS: No masses.  Visualized pancreatic duct normal caliber.

LIVER: No masses. Echotexture normal.

LIVER VASCULATURE: Normal directional flow of the main portal vein and hepatic veins.

GALLBLADDER: No stones. Normal wall thickness. No pericholecystic fluid.

ULTRASOUND-DETECTED PAYNE'S SIGN: Negative.

INTRAHEPATIC DUCTS AND COMMON DUCT: CBD and intrahepatic ducts normal caliber. No filling defects.

INFERIOR VENA CAVA: Normal flow.

AORTA: No aneurysm.

RIGHT KIDNEY:  Normal size. Normal echogenicity. No solid or suspicious masses. No hydronephrosis. No
 calcifications.

PERITONEAL AND RIGHT PLEURAL SPACE: No ascites or effusions.

OTHER: No other significant findings.



IMPRESSION:  NORMAL RIGHT UPPER QUADRANT ULTRASOUND.



TECHNICAL DOCUMENTATION:  JOB ID:  [id number]

 2011 [employer]- All Rights Reserved



Reading location - IP/workstation name: [email or web address]

## 2018-08-13 NOTE — ER DOCUMENT REPORT
ED Medical Screen (RME)





- General


Chief Complaint: ETOH Abuse


Stated Complaint: ALCOHOL WITHDRAWALS


Time Seen by Provider: 08/13/18 10:25


Notes: 





27-year-old male who presents complaining of alcohol withdrawal.  The patient 

tried to have a beer this morning and threw it back up.  Patient usually drinks 

1/2 gallon of whiskey daily.  He is having thoughts of suicide but no specific 

plan.  States he feels horrible this is likely he feels like this denies visual 

auditory hallucinations.  Does have a history of delirium with alcohol 

withdrawal.  Denies any alcohol withdrawal seizures.  Denies chest pain but has 

had nausea vomiting.  Denies abdominal pain.  Patient stated he feels terrible 

and comes here for further evaluation and treatment.


TRAVEL OUTSIDE OF THE [place] IN LAST 30 DAYS: No





- Related Data


Allergies/Adverse Reactions: 


 





cefaclor [From Ceclor] Allergy (Verified 08/13/18 09:57)


 











Past Medical History





- Past Medical History


Cardiac Medical History: Reports: Hx Hypertension


Renal/ Medical History: Denies: Hx Peritoneal Dialysis


Psychiatric Medical History: Reports: Hx Bipolar Disorder, Hx Depression - with 

anxiety





- Immunizations


Hx Diphtheria, Pertussis, Tetanus Vaccination: Yes





Physical Exam





- Vital signs


Vitals: 





 











Temp Pulse Resp BP Pulse Ox


 


 98.9 F   87   18   141/92 H  97 


 


 08/13/18 09:57  08/13/18 09:57  08/13/18 09:57  08/13/18 09:57  08/13/18 09:57














- Respiratory


Respiratory status: No respiratory distress


Breath sounds: Normal


Chest palpation: Normal





Course





- Re-evaluation


Re-evalutation: 





08/13/18 10:30


We will give the patient IV fluids and Zofran.  Will temper for any signs of 

withdrawal.  With benzodiazepines as needed





- Vital Signs


Vital signs: 





 











Temp Pulse Resp BP Pulse Ox


 


 98.9 F   87   18   141/92 H  97 


 


 08/13/18 09:57  08/13/18 09:57  08/13/18 09:57  08/13/18 09:57  08/13/18 09:57